# Patient Record
Sex: FEMALE | Race: BLACK OR AFRICAN AMERICAN | NOT HISPANIC OR LATINO | URBAN - METROPOLITAN AREA
[De-identification: names, ages, dates, MRNs, and addresses within clinical notes are randomized per-mention and may not be internally consistent; named-entity substitution may affect disease eponyms.]

---

## 2011-09-04 LAB — BCS RECOMMENDATION EXT: NORMAL

## 2018-02-16 LAB — BCS RECOMMENDATION EXT: NORMAL

## 2019-08-09 PROBLEM — I20.0 CRESCENDO ANGINA: Status: ACTIVE | Noted: 2019-07-20

## 2019-08-09 PROBLEM — D50.9 MICROCYTIC ANEMIA: Status: ACTIVE | Noted: 2019-07-21

## 2019-08-09 PROBLEM — E78.49 OTHER HYPERLIPIDEMIA: Status: ACTIVE | Noted: 2019-08-09

## 2019-08-09 PROBLEM — I10 ESSENTIAL HYPERTENSION: Status: ACTIVE | Noted: 2019-08-09

## 2019-08-09 PROBLEM — H11.31 CONJUNCTIVAL HEMORRHAGE OF RIGHT EYE: Status: ACTIVE | Noted: 2019-07-21

## 2019-08-09 PROBLEM — Z72.0 TOBACCO ABUSE: Status: ACTIVE | Noted: 2019-08-09

## 2019-08-09 PROBLEM — I24.9 ACUTE CORONARY SYNDROME: Status: ACTIVE | Noted: 2019-08-09

## 2019-08-09 PROBLEM — I25.10 CAD S/P PERCUTANEOUS CORONARY ANGIOPLASTY: Status: ACTIVE | Noted: 2019-08-09

## 2019-08-09 PROBLEM — M79.604 RIGHT LEG PAIN: Status: ACTIVE | Noted: 2019-07-21

## 2019-08-09 PROBLEM — E87.6 HYPOKALEMIA: Status: ACTIVE | Noted: 2019-07-21

## 2019-08-09 PROBLEM — Z98.61 CAD S/P PERCUTANEOUS CORONARY ANGIOPLASTY: Status: ACTIVE | Noted: 2019-08-09

## 2019-08-09 PROBLEM — I25.110 CORONARY ARTERY DISEASE INVOLVING NATIVE CORONARY ARTERY OF NATIVE HEART WITH UNSTABLE ANGINA PECTORIS: Status: ACTIVE | Noted: 2019-07-21

## 2020-09-30 PROBLEM — R29.898 RIGHT ARM WEAKNESS: Status: ACTIVE | Noted: 2020-09-30

## 2020-11-04 PROBLEM — G45.9 TRANSIENT CEREBRAL ISCHEMIA: Status: ACTIVE | Noted: 2020-11-04

## 2020-11-04 PROBLEM — E61.1 IRON DEFICIENCY: Status: ACTIVE | Noted: 2020-11-04

## 2021-04-28 DIAGNOSIS — Z12.31 OTHER SCREENING MAMMOGRAM: ICD-10-CM

## 2022-07-22 PROBLEM — K02.9 CARIES: Status: ACTIVE | Noted: 2022-07-22

## 2022-10-26 ENCOUNTER — PATIENT OUTREACH (OUTPATIENT)
Dept: ADMINISTRATIVE | Facility: HOSPITAL | Age: 52
End: 2022-10-26

## 2022-11-09 PROBLEM — Z86.73 OLD CEREBROVASCULAR ACCIDENT (CVA) WITHOUT LATE EFFECT: Status: ACTIVE | Noted: 2022-11-09

## 2023-02-13 PROBLEM — G45.9 TIA (TRANSIENT ISCHEMIC ATTACK): Status: RESOLVED | Noted: 2020-11-04 | Resolved: 2023-02-13

## 2023-07-01 ENCOUNTER — PATIENT OUTREACH (OUTPATIENT)
Dept: ADMINISTRATIVE | Facility: HOSPITAL | Age: 53
End: 2023-07-01

## 2023-07-01 DIAGNOSIS — Z12.11 SCREEN FOR COLON CANCER: Primary | ICD-10-CM

## 2023-07-14 PROBLEM — M54.2 CERVICALGIA: Status: ACTIVE | Noted: 2023-07-14

## 2023-07-21 PROBLEM — M79.602 BILATERAL ARM PAIN: Status: ACTIVE | Noted: 2023-07-21

## 2023-07-21 PROBLEM — M79.601 BILATERAL ARM PAIN: Status: ACTIVE | Noted: 2023-07-21

## 2023-07-21 PROBLEM — M50.30 DEGENERATIVE DISC DISEASE, CERVICAL: Status: ACTIVE | Noted: 2023-07-21

## 2023-07-21 PROBLEM — R20.2 NUMBNESS AND TINGLING: Status: ACTIVE | Noted: 2023-07-21

## 2023-07-21 PROBLEM — R20.0 NUMBNESS AND TINGLING: Status: ACTIVE | Noted: 2023-07-21

## 2024-02-06 ENCOUNTER — PATIENT OUTREACH (OUTPATIENT)
Dept: ADMINISTRATIVE | Facility: HOSPITAL | Age: 54
End: 2024-02-06

## 2024-02-19 PROBLEM — G40.109 LOCALIZATION-RELATED EPILEPSY: Status: ACTIVE | Noted: 2024-02-19

## 2024-03-15 ENCOUNTER — PATIENT MESSAGE (OUTPATIENT)
Dept: ADMINISTRATIVE | Facility: HOSPITAL | Age: 54
End: 2024-03-15

## 2024-03-15 ENCOUNTER — PATIENT OUTREACH (OUTPATIENT)
Dept: ADMINISTRATIVE | Facility: HOSPITAL | Age: 54
End: 2024-03-15

## 2024-03-18 ENCOUNTER — TELEPHONE (OUTPATIENT)
Dept: SMOKING CESSATION | Facility: CLINIC | Age: 54
End: 2024-03-18

## 2024-04-05 ENCOUNTER — TELEPHONE (OUTPATIENT)
Dept: SMOKING CESSATION | Facility: CLINIC | Age: 54
End: 2024-04-05

## 2024-04-08 ENCOUNTER — CLINICAL SUPPORT (OUTPATIENT)
Dept: SMOKING CESSATION | Facility: CLINIC | Age: 54
End: 2024-04-08
Payer: COMMERCIAL

## 2024-04-08 ENCOUNTER — TELEPHONE (OUTPATIENT)
Dept: SMOKING CESSATION | Facility: CLINIC | Age: 54
End: 2024-04-08

## 2024-04-08 DIAGNOSIS — F17.200 NICOTINE DEPENDENCE: Primary | ICD-10-CM

## 2024-04-08 PROCEDURE — 99404 PREV MED CNSL INDIV APPRX 60: CPT | Mod: 95,,,

## 2024-04-08 RX ORDER — IBUPROFEN 200 MG
1 TABLET ORAL DAILY
Qty: 14 PATCH | Refills: 0 | OUTPATIENT
Start: 2024-04-08

## 2024-04-08 RX ORDER — DM/P-EPHED/ACETAMINOPH/DOXYLAM 30-7.5/3
2 LIQUID (ML) ORAL
Qty: 168 LOZENGE | Refills: 0 | OUTPATIENT
Start: 2024-04-08 | End: 2024-04-22 | Stop reason: SDUPTHER

## 2024-04-08 NOTE — PROGRESS NOTES
Virtual appt was lost and her phone was not working when I tried to call her back.  I eventually got hold of her and completed intake.  She smokes 10 cpd and has used patches in hospital before and is excited to quit smoking for her health.

## 2024-04-15 ENCOUNTER — CLINICAL SUPPORT (OUTPATIENT)
Dept: SMOKING CESSATION | Facility: CLINIC | Age: 54
End: 2024-04-15
Payer: COMMERCIAL

## 2024-04-15 DIAGNOSIS — F17.200 NICOTINE DEPENDENCE: Primary | ICD-10-CM

## 2024-04-15 PROCEDURE — 99402 PREV MED CNSL INDIV APPRX 30: CPT | Mod: S$GLB,,,

## 2024-04-15 NOTE — PROGRESS NOTES
Individual Follow-Up Form    4/15/2024    Quit Date: TBD    Clinical Status of Patient: Outpatient    Length of Service: 30 minutes    Continuing Medication: yes  Patches or Nicotine Lozenges    Other Medications: none     Target Symptoms: Withdrawal and medication side effects. The following were  rated moderate (3) to severe (4) on TCRS:  Moderate (3): none  Severe (4): none    Comments: Pt is still smoking same amount.  Her NRT is not ready for  until later today. She is still hopeful about being able to quit smoking for better health.    The patient will continue with  therapy sessions and medication monitoring by CTTS. Prescribed medication management will be by physician.  Completion of TCRS (Tobacco Cessation Rating Scale) reviewed strategies, cues, and triggers. Introduced the negative impact of tobacco on health, the health advantages of discontinuing the use of tobacco, time line improved health changes after a quit, withdrawal issues to expect from nicotine and habit, and ways to achieve the goal of a quit.  The patient denies any abnormal behavioral or mental changes at this time.       Diagnosis: F17.200    Next Visit: 1 week

## 2024-04-22 ENCOUNTER — CLINICAL SUPPORT (OUTPATIENT)
Dept: SMOKING CESSATION | Facility: CLINIC | Age: 54
End: 2024-04-22
Payer: COMMERCIAL

## 2024-04-22 DIAGNOSIS — F17.200 NICOTINE DEPENDENCE: ICD-10-CM

## 2024-04-22 PROCEDURE — 99402 PREV MED CNSL INDIV APPRX 30: CPT | Mod: S$GLB,,,

## 2024-04-22 RX ORDER — DM/P-EPHED/ACETAMINOPH/DOXYLAM 30-7.5/3
2 LIQUID (ML) ORAL
Qty: 168 LOZENGE | Refills: 0 | OUTPATIENT
Start: 2024-04-22

## 2024-04-22 NOTE — PROGRESS NOTES
Individual Follow-Up Form  Pt called in early so I took appt early  4/22/2024    Quit Date: TBD    Clinical Status of Patient: Outpatient    Length of Service: 30 minutes    Continuing Medication: yes  Patches or Nicotine Lozenges    Other Medications: none     Target Symptoms: Withdrawal and medication side effects. The following were  rated moderate (3) to severe (4) on TCRS:  Moderate (3): none  Severe (4): none    Comments: Pt is still smoking 10 cpd because she hasn't picked up patches or lozenges.  She is eager to start using NRT.  She plans to  patches today and I transferred lozenges to another pharmacy per pt request.  The patient will continue with  therapy sessions and medication monitoring by CTTS. Prescribed medication management will be by physician.  completion of TCRS (Tobacco Cessation Rating Scale) reviewed strategies, controlling environment, cues, triggers, new goals set. Introduced high risk situations with preparation interventions, caffeine similarities with withdrawal issues of habit and nicotine, Alcohol, Understanding urges, cravings, stress and relaxation. Open discussion with intervention discussion.  The patient denies any abnormal behavioral or mental changes at this time.       Diagnosis: F17.200    Next Visit: 2 weeks

## 2024-04-30 ENCOUNTER — CLINICAL SUPPORT (OUTPATIENT)
Dept: SMOKING CESSATION | Facility: CLINIC | Age: 54
End: 2024-04-30
Payer: COMMERCIAL

## 2024-04-30 DIAGNOSIS — F17.200 NICOTINE DEPENDENCE: Primary | ICD-10-CM

## 2024-04-30 PROCEDURE — 99402 PREV MED CNSL INDIV APPRX 30: CPT | Mod: S$GLB,,,

## 2024-04-30 NOTE — PROGRESS NOTES
Individual Follow-Up Form    4/30/2024    Quit Date: TBD    Clinical Status of Patient: Outpatient    Length of Service: 30 minutes    Continuing Medication: yes  Patches or Nicotine Lozenges    Other Medications: none     Target Symptoms: Withdrawal and medication side effects. The following were  rated moderate (3) to severe (4) on TCRS:  Moderate (3): none  Severe (4): none    Comments: Pt is still mcztew27 cpd with no reduction while using patches daily.  She is picking up lozenges today and I advised pt on usage.  Pt is till still smoking inside and I urged  to smoke outside to see the benefits of doing so.  She agreed.  The patient will continue with  therapy sessions and medication monitoring by CTTS. Prescribed medication management will be by physician.  Completion of TCRS (Tobacco Cessation Rating Scale) reviewed strategies, habitual behavior, stress, and high risk situations. Introduced stress with addition interventions, SOLVE, relaxation with interventions, nutrition, exercise, weight gain, and the importance of rewarding oneself for accomplishments toward becoming tobacco free. Open discussion of all items with interventions.   The patient denies any abnormal behavioral or mental changes at this time.       Diagnosis: F17.200    Next Visit: 1 week

## 2024-05-07 ENCOUNTER — TELEPHONE (OUTPATIENT)
Dept: SMOKING CESSATION | Facility: CLINIC | Age: 54
End: 2024-05-07

## 2024-05-10 PROBLEM — M54.40 CHRONIC LOW BACK PAIN WITH SCIATICA: Status: ACTIVE | Noted: 2024-05-10

## 2024-05-10 PROBLEM — G89.29 CHRONIC LOW BACK PAIN WITH SCIATICA: Status: ACTIVE | Noted: 2024-05-10

## 2024-07-18 ENCOUNTER — TELEPHONE (OUTPATIENT)
Dept: SMOKING CESSATION | Facility: CLINIC | Age: 54
End: 2024-07-18
Payer: COMMERCIAL

## 2024-10-09 ENCOUNTER — TELEPHONE (OUTPATIENT)
Dept: SMOKING CESSATION | Facility: CLINIC | Age: 54
End: 2024-10-09
Payer: COMMERCIAL

## 2024-10-31 ENCOUNTER — TELEPHONE (OUTPATIENT)
Dept: SMOKING CESSATION | Facility: CLINIC | Age: 54
End: 2024-10-31
Payer: COMMERCIAL

## 2025-02-23 PROBLEM — M79.604 RIGHT LEG PAIN: Status: RESOLVED | Noted: 2019-07-21 | Resolved: 2025-02-23

## 2025-02-23 PROBLEM — E87.6 HYPOKALEMIA: Status: RESOLVED | Noted: 2019-07-21 | Resolved: 2025-02-23

## 2025-02-23 PROBLEM — R20.0 NUMBNESS AND TINGLING: Status: RESOLVED | Noted: 2023-07-21 | Resolved: 2025-02-23

## 2025-02-23 PROBLEM — R20.2 NUMBNESS AND TINGLING: Status: RESOLVED | Noted: 2023-07-21 | Resolved: 2025-02-23

## 2025-02-23 PROBLEM — N94.10 DYSPAREUNIA IN FEMALE: Status: ACTIVE | Noted: 2025-02-23

## 2025-02-23 PROBLEM — R29.898 RIGHT ARM WEAKNESS: Status: RESOLVED | Noted: 2020-09-30 | Resolved: 2025-02-23

## 2025-02-23 PROBLEM — I10 ESSENTIAL HYPERTENSION: Status: RESOLVED | Noted: 2019-08-09 | Resolved: 2025-02-23

## 2025-03-15 PROBLEM — G40.909 EPILEPSY: Status: ACTIVE | Noted: 2024-02-19

## 2025-03-15 PROBLEM — E66.01 CLASS 3 SEVERE OBESITY IN ADULT: Status: ACTIVE | Noted: 2025-03-15

## 2025-03-15 PROBLEM — I63.9 STROKE: Status: ACTIVE | Noted: 2025-03-15

## 2025-03-15 PROBLEM — E66.813 CLASS 3 SEVERE OBESITY IN ADULT: Status: ACTIVE | Noted: 2025-03-15

## 2025-03-17 ENCOUNTER — PATIENT OUTREACH (OUTPATIENT)
Dept: ADMINISTRATIVE | Facility: CLINIC | Age: 55
End: 2025-03-17
Payer: COMMERCIAL

## 2025-03-17 NOTE — PROGRESS NOTES
C3 nurse attempted to contact Blanquita Heard for a TCC post hospital discharge follow up call. The patient is unable to conduct the call @ this time. The patient requested a callback tomorrow afternoon when she has her medications available.     The patient has a scheduled HOSFU appointment with Jasmin Arango NP on 03/27/25 @ 1100.

## 2025-03-18 ENCOUNTER — PATIENT MESSAGE (OUTPATIENT)
Dept: ADMINISTRATIVE | Facility: CLINIC | Age: 55
End: 2025-03-18
Payer: COMMERCIAL

## 2025-03-19 NOTE — PROGRESS NOTES
3rd Attempt made to reach patient for TCC call. Left message with pt's daughter for pt to please call [phone] leave first name, last name, and  for Yaya.  I will return your call.

## 2025-04-03 ENCOUNTER — CLINICAL SUPPORT (OUTPATIENT)
Dept: SMOKING CESSATION | Facility: CLINIC | Age: 55
End: 2025-04-03
Payer: COMMERCIAL

## 2025-04-03 DIAGNOSIS — F17.200 NICOTINE DEPENDENCE: Primary | ICD-10-CM

## 2025-04-03 PROCEDURE — 99999 PR PBB SHADOW E&M-EST. PATIENT-LVL I: CPT | Mod: PBBFAC,,,

## 2025-04-03 PROCEDURE — 99407 BEHAV CHNG SMOKING > 10 MIN: CPT | Mod: S$GLB,,, | Performed by: GENERAL PRACTICE

## 2025-04-03 NOTE — PROGRESS NOTES
Spoke with patient today in regard to smoking cessation progress 3/6/12 month telephone follow up, she states that she is not tobacco free.  Patient states she is smoking the original amount from start of program.  Informed patient of benefit period, future follow up, and contact information if any further help or support is needed.  Patient not ready to schedule appointment at this time.  Will complete smart form for 3/6/12 month follow up on Quit attempt #1 and resolve episode.

## 2025-04-07 PROBLEM — I25.10 CAD (CORONARY ARTERY DISEASE): Status: ACTIVE | Noted: 2019-07-21

## 2025-04-07 PROBLEM — R53.1 LEFT-SIDED WEAKNESS: Status: ACTIVE | Noted: 2025-04-07

## 2025-04-11 PROBLEM — I63.9 CEREBROVASCULAR ACCIDENT (CVA): Status: ACTIVE | Noted: 2025-04-11

## 2025-04-11 PROBLEM — E78.5 HYPERLIPIDEMIA LDL GOAL <70: Status: ACTIVE | Noted: 2025-04-11

## 2025-04-11 PROBLEM — R53.1 ACUTE LEFT-SIDED WEAKNESS: Status: ACTIVE | Noted: 2025-04-11

## 2025-04-11 PROBLEM — E66.812 CLASS 2 OBESITY WITH BODY MASS INDEX (BMI) OF 39.0 TO 39.9 IN ADULT: Status: ACTIVE | Noted: 2025-04-11

## 2025-04-11 PROBLEM — I65.21 ICAO (INTERNAL CAROTID ARTERY OCCLUSION), RIGHT: Status: ACTIVE | Noted: 2025-04-11

## 2025-04-22 PROBLEM — I20.89 CHRONIC STABLE ANGINA: Status: ACTIVE | Noted: 2025-04-22

## 2025-04-22 PROBLEM — F51.01 PRIMARY INSOMNIA: Status: ACTIVE | Noted: 2025-04-22

## 2025-04-29 ENCOUNTER — OUTPATIENT CASE MANAGEMENT (OUTPATIENT)
Dept: ADMINISTRATIVE | Facility: OTHER | Age: 55
End: 2025-04-29
Payer: COMMERCIAL

## 2025-04-29 NOTE — PROGRESS NOTES
Consult received to discuss assistance with inpatient rehab from [facility]. DANNY contacted pt at [phone]. Pt provided 2 identifiers name and address. Pt provided permission for SW to speak with her daughter- Rivas. DANNY informed both the pt and daughter that DANNY had sent discharge note, referral and face sheet  via fax # [phone] to [facility] and contacted them at [phone] and spoke with Margie admission director and inform Margie that DANNY had sent via fax a referral. Margie stated that they will be running the information and see if pt insurance collaborates with [facility].     Daughter asked about pt adult brief resources. SW provided Adult briefs resources at [facility] [address], [address] phone [phone]  hours Tuesday and Thursday 12 pm - 4 pm. Daughter stated that SW can contact them tomorrow at 10 am due to daughter going to the [facility] to check in about  pt briefs. Caregiver voiced she understood. DANNY will follow up with pt tomorrow at 10 am about [facility] and [facility] enrollment.       Marina SALEH, SW  [phone]

## 2025-04-30 ENCOUNTER — OUTPATIENT CASE MANAGEMENT (OUTPATIENT)
Dept: ADMINISTRATIVE | Facility: OTHER | Age: 55
End: 2025-04-30
Payer: COMMERCIAL

## 2025-04-30 NOTE — PROGRESS NOTES
Outpatient Care Management  Patient Not Qualified    Patient: Blanquita Heard  MRN:  [id number]  Date of Service:  4/30/2025  Completed by:  Marina Spears LMSW    Chief Complaint   Patient presents with    Case Closure       Patient Summary           Reason Not Qualified:  Needs met by others       Consult received to discuss assistance with inpatient rehab. SW contact Brenda at [phone]. Brenda stated that they received everything and contact Daughter yesterday and informed daughter that pt will not be accepted due to insurance and daughter stated that pt will be switching over to Traditional Medicare tomorrow. SW contacted Daughter/Caregiver- Rivas at [phone], Daughter stated that pt was able to received her adult briefs yesterday from the [facility] and was very grateful. Daughter asked about pt medication if it was filled if not she will stopping by [facility] to check in about pt medication and then head to the pharmacy to  pt medication. Daughter stated she had no questions at this time and no needs, will be waiting to get a call back from [facility] to set up assessment. No more needs at this time.Caregiver/daughter voiced she understood.  DANNY provided contact info [phone] for future needs or questions.       Marina SALEH LMSW  [phone]

## 2025-05-05 PROBLEM — R13.10: Status: ACTIVE | Noted: 2025-05-05

## 2025-05-09 ENCOUNTER — OUTPATIENT CASE MANAGEMENT (OUTPATIENT)
Dept: ADMINISTRATIVE | Facility: OTHER | Age: 55
End: 2025-05-09
Payer: COMMERCIAL

## 2025-05-14 NOTE — PROGRESS NOTES
Consult received by Jasmin Arango NP via epic chat about caregiver/daughter- Rivas reaching out to Jasmin Arango about assistance for admitting pt to another facility. SW contacted caregiver/daughter- [phone] . Caregiver stated that pt was not approved to go to [facility] by insurance, Caregiver stated that pt could be admitted at [facility]. Sw contacted them at Phone: [phone] and sent referral with clinical note, face sheet, and discharge note to Fax # [phone].     Sw received a phone call from Sapna admission director from [facility]  phone # [phone] stating that pt will not be able to be admitted through rehab due to [employer] and pt will need to be admitted through long term personal care due to no PT/OT notes and needing for pt to stay in the hospital one night. DANNY contacted [facility] at [phone]  and asked about PT notes, DANNY contacted [facility]  office at [phone] spoke with Erika and stated will be faxing PT/OT note from the last 3 weeks to [facility] fax # [phone]. DANNY contacted Sapna from [facility] phone # [phone]  left a VM informing her that [facility] has PT/OT notes and will be faxing them.  SW contacted caregiver- [phone] and update her on the process, caregiver voiced she understood. DANNY will follow up with her tomorrow.       Marina SALEH, [facility]  [phone]

## 2025-05-15 ENCOUNTER — OUTPATIENT CASE MANAGEMENT (OUTPATIENT)
Dept: ADMINISTRATIVE | Facility: OTHER | Age: 55
End: 2025-05-15
Payer: COMMERCIAL

## 2025-05-15 NOTE — PROGRESS NOTES
"  DANNY contacted daughter- Rivas [phone]. Danny informed daughter that pt is not able to be accepted at [facility] through the rehab process due to medical insurance, they will accept pt through the Long Term Care even with PT/OT notes received from [facility] ([facility] Daughter stated that pt is unable to go to the nursing home through long term care due to pt having a, "new car note of $600 and her house payment." Daughter asked about [facility] hospital.Danny informed daughter that SW will fax over referral, and update daughter today or tomorrow. Daughter voiced she understood.      DANNY contacted Awa- admission liaison from [facility] and update and sending referral with clinical note, discharge note, face sheet, and referral to fax # [phone].DANNY contacted [facility] office at [phone] spoke with Erika and stated will be faxing PT/OT note from the last 3 weeks to [facility] fax #[phone].      DANNY message Awa of an update at 3:330 pm. Awa stated that [facility] had just sent the PT/OT notes. DANNY updated Valencia Arango NP. DANNY will follow up with daughter tomorrow.       Marina Spears MSW, LMSW  [phone]       "

## 2025-05-20 PROBLEM — Z78.9: Status: ACTIVE | Noted: 2025-05-20

## 2025-05-20 PROBLEM — I48.91 ATRIAL FIBRILLATION WITH RVR: Status: ACTIVE | Noted: 2025-05-20

## 2025-05-21 PROBLEM — I48.91 NEW ONSET A-FIB: Status: ACTIVE | Noted: 2025-05-21

## 2025-05-21 PROBLEM — G81.90 HEMIPARESIS: Status: ACTIVE | Noted: 2025-05-21

## 2025-05-21 PROBLEM — I69.398 ALTERED MOBILITY DUE TO OLD STROKE: Status: ACTIVE | Noted: 2025-05-21

## 2025-05-21 PROBLEM — R26.9 ALTERED MOBILITY DUE TO OLD STROKE: Status: ACTIVE | Noted: 2025-05-21

## 2025-05-22 ENCOUNTER — OUTPATIENT CASE MANAGEMENT (OUTPATIENT)
Dept: ADMINISTRATIVE | Facility: OTHER | Age: 55
End: 2025-05-22
Payer: COMMERCIAL

## 2025-05-22 PROBLEM — F17.200 TOBACCO DEPENDENCY: Status: ACTIVE | Noted: 2025-05-22

## 2025-05-22 NOTE — PROGRESS NOTES
DANNY called daughter  Rivas back at [phone]. Danny provided [facility] main phone number [phone] to be able to get in contact with  Marta Benson RN. DANNY reached out to MELIDA Lozano via epic chat. Daughter voiced she understood. No further needs at this current moment. Provided SW contact info [phone] for future needs.     Marina Spears MSW,LMSW  [phone]

## 2025-06-24 ENCOUNTER — TELEPHONE (OUTPATIENT)
Dept: PHARMACY | Facility: CLINIC | Age: 55
End: 2025-06-24
Payer: COMMERCIAL
